# Patient Record
Sex: MALE | Race: WHITE
[De-identification: names, ages, dates, MRNs, and addresses within clinical notes are randomized per-mention and may not be internally consistent; named-entity substitution may affect disease eponyms.]

---

## 2020-11-25 ENCOUNTER — HOSPITAL ENCOUNTER (OUTPATIENT)
Dept: HOSPITAL 47 - RADMRIMAIN | Age: 60
Discharge: HOME | End: 2020-11-25
Attending: ORTHOPAEDIC SURGERY
Payer: COMMERCIAL

## 2020-11-25 DIAGNOSIS — S83.241A: Primary | ICD-10-CM

## 2020-11-25 DIAGNOSIS — M17.11: ICD-10-CM

## 2020-11-25 NOTE — MR
EXAMINATION TYPE: MR knee RT wo con

 

DATE OF EXAM: 11/25/2020

 

COMPARISON: Outside right knee x-ray November 6, 2020

 

HISTORY: Right Knee Pain. In her knee pain with locking and swelling for 2 months per patient.

 

TECHNIQUE: Multiplanar, multisequence imaging of the right knee is performed without IV contrast.

 

FINDINGS:

 

MEDIAL MENISCUS: Anterior horn is intact without tear. Posterior horn has oblique increased signal ex
tends to the inferior articular surface and involves central body sagittal image 7.

 

LATERAL MENISCUS: Anterior and posterior horns are intact without tear.

 

CRUCIATE LIGAMENTS: The anterior and posterior cruciate ligaments are intact and unremarkable. Focal 
fluid anterior to the ACL.

 

COLLATERAL LIGAMENTS: The medial collateral ligament and lateral collateral ligament complex are inta
ct and unremarkable.

 

EXTENSOR MECHANISM: Visualized quadriceps and patellar tendons are intact.

 

EFFUSION:  Small to tiny suprapatellar joint effusion.

 

POPLITEAL CYST:  Small to moderate-sized popliteal/baker cyst measuring 5.5 cm long axis sagittal justen
ge 10.

 

TRICOMPARTMENT SPACES: Moderate narrowing and spurring patellofemoral compartment.

 

CARTILAGE: There is a chondromalacia patella with thinning of articular cartilage along the posterior
 superior patellar pole. Medial full-thickness cartilaginous loss noted.

 

BONE MARROW SIGNAL: Focus of increased T2 signal superior patella site of cartilaginous loss.

 

OTHER:  No additional significant abnormality is appreciated.

 

IMPRESSION:

1. Oblique full thickness tear posterior horn medial meniscus extending into central body.

2. Moderate to borderline severe patellofemoral joint arthropathy as detailed above.

3. Small to moderate-sized popliteal cyst.

## 2020-12-08 ENCOUNTER — HOSPITAL ENCOUNTER (OUTPATIENT)
Dept: HOSPITAL 47 - LABPAT | Age: 60
Discharge: HOME | End: 2020-12-08
Attending: ORTHOPAEDIC SURGERY
Payer: COMMERCIAL

## 2020-12-08 DIAGNOSIS — M23.91: Primary | ICD-10-CM

## 2020-12-08 LAB
ANION GAP SERPL CALC-SCNC: 3 MMOL/L
BASOPHILS # BLD AUTO: 0.1 K/UL (ref 0–0.2)
BASOPHILS NFR BLD AUTO: 2 %
CHLORIDE SERPL-SCNC: 105 MMOL/L (ref 98–107)
CO2 SERPL-SCNC: 28 MMOL/L (ref 22–30)
EOSINOPHIL # BLD AUTO: 0.4 K/UL (ref 0–0.7)
EOSINOPHIL NFR BLD AUTO: 7 %
ERYTHROCYTE [DISTWIDTH] IN BLOOD BY AUTOMATED COUNT: 4.92 M/UL (ref 4.3–5.9)
ERYTHROCYTE [DISTWIDTH] IN BLOOD: 11.9 % (ref 11.5–15.5)
HCT VFR BLD AUTO: 46.8 % (ref 39–53)
HGB BLD-MCNC: 16.2 GM/DL (ref 13–17.5)
LYMPHOCYTES # SPEC AUTO: 1.8 K/UL (ref 1–4.8)
LYMPHOCYTES NFR SPEC AUTO: 29 %
MCH RBC QN AUTO: 32.8 PG (ref 25–35)
MCHC RBC AUTO-ENTMCNC: 34.5 G/DL (ref 31–37)
MCV RBC AUTO: 95.1 FL (ref 80–100)
MONOCYTES # BLD AUTO: 0.3 K/UL (ref 0–1)
MONOCYTES NFR BLD AUTO: 5 %
NEUTROPHILS # BLD AUTO: 3.3 K/UL (ref 1.3–7.7)
NEUTROPHILS NFR BLD AUTO: 55 %
PLATELET # BLD AUTO: 179 K/UL (ref 150–450)
POTASSIUM SERPL-SCNC: 4.6 MMOL/L (ref 3.5–5.1)
SODIUM SERPL-SCNC: 136 MMOL/L (ref 137–145)
WBC # BLD AUTO: 6 K/UL (ref 3.8–10.6)

## 2020-12-08 PROCEDURE — 80051 ELECTROLYTE PANEL: CPT

## 2020-12-08 PROCEDURE — 93005 ELECTROCARDIOGRAM TRACING: CPT

## 2020-12-08 PROCEDURE — 85025 COMPLETE CBC W/AUTO DIFF WBC: CPT

## 2020-12-08 PROCEDURE — 36415 COLL VENOUS BLD VENIPUNCTURE: CPT

## 2020-12-15 NOTE — HP
HISTORY AND PHYSICAL



DATE OF SURGERY:

12/16/2020



Alen Waterman is a 60-year-old gentleman seen with progressive right knee pain.  We

discussed options for treatment.  He elected to proceed with arthroscopy.  Consent was

obtained.



PAST MEDICAL HISTORY:

Noncontributory.



PAST SURGICAL HISTORY:

Noncontributory.



DAILY MEDICATIONS:

None.



ALLERGIES:

PENICILLIN.



SOCIAL HISTORY:

Denies current tobacco use.



PHYSICAL EVALUATION OF THE RIGHT KNEE:

Range of motion 0-130.  Mild effusion.  Tenderness medial joint line.  Positive medial

Elly's.  Ligaments stable.  Hip rotation without pain.  Distal neurovascular exam

intact.



RADIOGRAPHS:

Right knee radiographs revealed mild osteoarthritis. MRI right knee revealed medial

meniscal tear and osteoarthritic changes.



IMPRESSION:

1. Internal derangement, right knee with medial meniscal tear.

2. Right knee osteoarthritis.



PLAN:

Right knee arthroscopy with partial meniscectomy and debridement.





MMODL / IJN: [id number] / Job#: [id number]

## 2020-12-16 ENCOUNTER — HOSPITAL ENCOUNTER (OUTPATIENT)
Dept: HOSPITAL 47 - OR | Age: 60
Discharge: HOME | End: 2020-12-16
Attending: ORTHOPAEDIC SURGERY
Payer: COMMERCIAL

## 2020-12-16 VITALS — RESPIRATION RATE: 16 BRPM

## 2020-12-16 VITALS — HEART RATE: 79 BPM | DIASTOLIC BLOOD PRESSURE: 63 MMHG | SYSTOLIC BLOOD PRESSURE: 115 MMHG

## 2020-12-16 VITALS — BODY MASS INDEX: 28.8 KG/M2

## 2020-12-16 VITALS — TEMPERATURE: 96.8 F

## 2020-12-16 DIAGNOSIS — Z87.891: ICD-10-CM

## 2020-12-16 DIAGNOSIS — M22.41: ICD-10-CM

## 2020-12-16 DIAGNOSIS — Z88.0: ICD-10-CM

## 2020-12-16 DIAGNOSIS — M23.221: Primary | ICD-10-CM

## 2020-12-16 DIAGNOSIS — M67.51: ICD-10-CM

## 2020-12-16 DIAGNOSIS — K21.9: ICD-10-CM

## 2020-12-16 DIAGNOSIS — M65.861: ICD-10-CM

## 2020-12-16 PROCEDURE — 29881 ARTHRS KNE SRG MNISECTMY M/L: CPT

## 2020-12-16 RX ADMIN — POTASSIUM CHLORIDE ONE MLS: 14.9 INJECTION, SOLUTION INTRAVENOUS at 11:39

## 2020-12-16 NOTE — P.OP
Date of Procedure: 12/16/20


Preoperative Diagnosis: 


Internal derangement right knee


Postoperative Diagnosis: 


1.  Tear medial meniscus right knee


2.  Grade 3 chondromalacia patella right knee


3.  Medial plica right knee


4.  Reactive synovitis medial, lateral and suprapatellar compartments right knee





Procedure(s) Performed: 


1.  Arthroscopic partial medial meniscectomy right knee


2.  Arthroscopic chondroplasty patella right knee


3.  Arthroscopic excision medial plica right knee


4.  Arthroscopic partial synovectomy medial, lateral and suprapatellar 

compartments right knee





Anesthesia: GETA, local


Surgeon: Alen Perry


Estimated Blood Loss (ml): 10


Pathology: none sent


Condition: stable


Disposition: PACU


Indications for Procedure: 


60-year-old patient seen with progressive right knee pain.  After having 

treatment options discussed, he elected to proceed with arthroscopy.


Operative Findings: 


See description of procedure


Description of Procedure: 


Patient was taken to the operative suite.  Patient underwent a general 

anesthetic by the department of anesthesia.  Patient was given preoperative 

antibiotics.  The right lower extremity was placed in a well-padded arthroscopic

leg thomason.  The right leg was prepped and draped in the normal sterile 

orthopedic fashion.  A lateral parapatellar and suprapatellar incision was made.

 Trochars were inserted.  Arthroscopy was initiated.  Suprapatellar pouch 

revealed diffuse thick reactive synovitis.  The patellofemoral joint appeared to

articulate congruently.  There as grade 3 chondromalacia mainly involving the 

medial facet with diffuse osteochondral tears present.  The scope was guided 

into the medial gutter.  There was a large plica which did impinge along the 

medial femoral condyle with range of motion.  The scope was then guided into the

medial compartment.  A medial parapatellar incision was made.  Trocar inserted 

followed by probe.  There was a complex tear involving the posterior horn of the

medial meniscus.  There were grade 1 chondromalacia changes of the medial 

compartment with no osteochondral tears present.  There was thick reactive 

synovitis anteriorly.  I performed a partial medial meniscectomy getting down to

stable meniscal tissue.  I performed a partial synovectomy decompressing the 

reactive synovitis.  The residual meniscus was probed and was found to be 

stable.  There was good decompression of the synovitis.  Scope and probe were 

then guided into the intercondylar notch.  Cruciates were identified, probed and

found to be stable.  The scope and probe were then guided into lateral 

compartment.  The lateral meniscus was probed and found to be stable.  There was

some mild superficial fraying midbody area.  There was thick reactive synovitis 

anteriorly.  There were grade 1 chondral changes of the lateral compartment.  A 

motorize shaver was introduced.  I debrided some of that superficial fraying of 

the meniscus.  I performed a partial synovectomy decompressing the reactive 

synovitis.  The shaver was removed.  There was good decompression of the synovit

is.  The scope was in guided back into the suprapatellar compartment.  I 

introduced a motorized shaver into the super patellar compartment.  I debrided 

some piecemeal fragments of meniscus I encountered.  I resected/excise that 

medial plica.  I performed a chondroplasty of the patella getting down to stable

osteochondral tissue.  I performed a partial synovectomy decompressing the 

reactive synovitis.  The shaver was removed.  The residual osteochondral surface

of the patella was stable.  The knee was taken through range of motion and it 

was complete resection of that medial plica.  There was good decompression of 

the synovitis.  I now took one more look on the entire knee, no residual debris.

 Instruments were now removed from the joint.  The joint was infiltrated with 

.25% Marcaine.  Steri-Strips were applied to the portal sites.  Sterile 

dressings were applied.  The patient was placed into a MIGUEL hose.  No tourniquet 

was utilized.  The patient was awakened, transferred to a bed and taken to 

recovery stable satisfactory condition.